# Patient Record
Sex: MALE | Race: OTHER | Employment: UNEMPLOYED | URBAN - METROPOLITAN AREA
[De-identification: names, ages, dates, MRNs, and addresses within clinical notes are randomized per-mention and may not be internally consistent; named-entity substitution may affect disease eponyms.]

---

## 2017-10-26 ENCOUNTER — HOSPITAL ENCOUNTER (INPATIENT)
Age: 42
LOS: 1 days | Discharge: HOME | DRG: 465 | End: 2017-10-27
Attending: EMERGENCY MEDICINE | Admitting: INTERNAL MEDICINE
Payer: MEDICARE

## 2017-10-26 ENCOUNTER — APPOINTMENT (OUTPATIENT)
Dept: GENERAL RADIOLOGY | Age: 42
DRG: 465 | End: 2017-10-26
Payer: MEDICARE

## 2017-10-26 DIAGNOSIS — N17.9 ACUTE KIDNEY INJURY (HCC): ICD-10-CM

## 2017-10-26 DIAGNOSIS — N20.1 URETEROLITHIASIS: ICD-10-CM

## 2017-10-26 DIAGNOSIS — N23 RENAL COLIC: Primary | ICD-10-CM

## 2017-10-26 LAB
ABSOLUTE EOS #: 0.2 K/UL (ref 0–0.4)
ABSOLUTE IMMATURE GRANULOCYTE: ABNORMAL K/UL (ref 0–0.3)
ABSOLUTE LYMPH #: 1.1 K/UL (ref 1–4.8)
ABSOLUTE MONO #: 0.6 K/UL (ref 0.2–0.8)
ANION GAP SERPL CALCULATED.3IONS-SCNC: 12 MMOL/L (ref 9–17)
BASOPHILS # BLD: 0 %
BASOPHILS ABSOLUTE: 0 K/UL (ref 0–0.2)
BILIRUBIN URINE: NEGATIVE
BUN BLDV-MCNC: 13 MG/DL (ref 6–20)
BUN/CREAT BLD: 7 (ref 9–20)
CALCIUM SERPL-MCNC: 8.3 MG/DL (ref 8.6–10.4)
CHLORIDE BLD-SCNC: 102 MMOL/L (ref 98–107)
CO2: 25 MMOL/L (ref 20–31)
COLOR: YELLOW
COMMENT UA: NORMAL
CREAT SERPL-MCNC: 1.81 MG/DL (ref 0.7–1.2)
DIFFERENTIAL TYPE: ABNORMAL
EOSINOPHILS RELATIVE PERCENT: 2 %
GFR AFRICAN AMERICAN: 51 ML/MIN
GFR NON-AFRICAN AMERICAN: 42 ML/MIN
GFR SERPL CREATININE-BSD FRML MDRD: ABNORMAL ML/MIN/{1.73_M2}
GFR SERPL CREATININE-BSD FRML MDRD: ABNORMAL ML/MIN/{1.73_M2}
GLUCOSE BLD-MCNC: 93 MG/DL (ref 70–99)
GLUCOSE URINE: NEGATIVE
HCT VFR BLD CALC: 36.4 % (ref 41–53)
HEMOGLOBIN: 12.2 G/DL (ref 13.5–17.5)
IMMATURE GRANULOCYTES: ABNORMAL %
KETONES, URINE: NEGATIVE
LEUKOCYTE ESTERASE, URINE: NEGATIVE
LYMPHOCYTES # BLD: 14 %
MCH RBC QN AUTO: 27.2 PG (ref 26–34)
MCHC RBC AUTO-ENTMCNC: 33.5 G/DL (ref 31–37)
MCV RBC AUTO: 81.2 FL (ref 80–100)
MONOCYTES # BLD: 8 %
NITRITE, URINE: NEGATIVE
PDW BLD-RTO: 13.4 % (ref 11.5–14.5)
PH UA: 5.5 (ref 5–8)
PLATELET # BLD: 129 K/UL (ref 130–400)
PLATELET ESTIMATE: ABNORMAL
PMV BLD AUTO: 9.5 FL (ref 6–12)
POTASSIUM SERPL-SCNC: 4 MMOL/L (ref 3.7–5.3)
PROTEIN UA: NEGATIVE
RBC # BLD: 4.48 M/UL (ref 4.5–5.9)
RBC # BLD: ABNORMAL 10*6/UL
SEG NEUTROPHILS: 76 %
SEGMENTED NEUTROPHILS ABSOLUTE COUNT: 6.2 K/UL (ref 1.8–7.7)
SODIUM BLD-SCNC: 139 MMOL/L (ref 135–144)
SPECIFIC GRAVITY UA: 1.02 (ref 1–1.03)
TURBIDITY: CLEAR
URINE HGB: NEGATIVE
UROBILINOGEN, URINE: NORMAL
WBC # BLD: 8.1 K/UL (ref 3.5–11)
WBC # BLD: ABNORMAL 10*3/UL

## 2017-10-26 PROCEDURE — 80048 BASIC METABOLIC PNL TOTAL CA: CPT

## 2017-10-26 PROCEDURE — 99285 EMERGENCY DEPT VISIT HI MDM: CPT

## 2017-10-26 PROCEDURE — 1200000000 HC SEMI PRIVATE

## 2017-10-26 PROCEDURE — 74000 XR ABDOMEN LIMITED (KUB): CPT

## 2017-10-26 PROCEDURE — 81003 URINALYSIS AUTO W/O SCOPE: CPT

## 2017-10-26 PROCEDURE — 6360000002 HC RX W HCPCS: Performed by: NURSE PRACTITIONER

## 2017-10-26 PROCEDURE — 2580000003 HC RX 258: Performed by: NURSE PRACTITIONER

## 2017-10-26 PROCEDURE — 85025 COMPLETE CBC W/AUTO DIFF WBC: CPT

## 2017-10-26 RX ORDER — SODIUM CHLORIDE 9 MG/ML
INJECTION, SOLUTION INTRAVENOUS CONTINUOUS
Status: DISCONTINUED | OUTPATIENT
Start: 2017-10-26 | End: 2017-10-27 | Stop reason: DRUGHIGH

## 2017-10-26 RX ORDER — CIPROFLOXACIN 2 MG/ML
400 INJECTION, SOLUTION INTRAVENOUS EVERY 12 HOURS
Status: DISCONTINUED | OUTPATIENT
Start: 2017-10-26 | End: 2017-10-27 | Stop reason: HOSPADM

## 2017-10-26 RX ORDER — 0.9 % SODIUM CHLORIDE 0.9 %
1000 INTRAVENOUS SOLUTION INTRAVENOUS ONCE
Status: COMPLETED | OUTPATIENT
Start: 2017-10-26 | End: 2017-10-26

## 2017-10-26 RX ADMIN — SODIUM CHLORIDE 1000 ML: 9 INJECTION, SOLUTION INTRAVENOUS at 19:32

## 2017-10-26 RX ADMIN — CIPROFLOXACIN 400 MG: 2 INJECTION, SOLUTION INTRAVENOUS at 22:57

## 2017-10-26 RX ADMIN — SODIUM CHLORIDE: 9 INJECTION, SOLUTION INTRAVENOUS at 19:35

## 2017-10-26 ASSESSMENT — PAIN SCALES - GENERAL: PAINLEVEL_OUTOF10: 4

## 2017-10-26 ASSESSMENT — PAIN SCALES - WONG BAKER: WONGBAKER_NUMERICALRESPONSE: 4

## 2017-10-26 ASSESSMENT — PAIN DESCRIPTION - LOCATION: LOCATION: FLANK

## 2017-10-26 ASSESSMENT — PAIN DESCRIPTION - FREQUENCY: FREQUENCY: INTERMITTENT

## 2017-10-26 ASSESSMENT — PAIN DESCRIPTION - DESCRIPTORS: DESCRIPTORS: SHARP

## 2017-10-27 ENCOUNTER — APPOINTMENT (OUTPATIENT)
Dept: CT IMAGING | Age: 42
DRG: 465 | End: 2017-10-27
Payer: MEDICARE

## 2017-10-27 ENCOUNTER — ANESTHESIA (OUTPATIENT)
Dept: OPERATING ROOM | Age: 42
DRG: 465 | End: 2017-10-27
Payer: MEDICARE

## 2017-10-27 ENCOUNTER — ANESTHESIA EVENT (OUTPATIENT)
Dept: OPERATING ROOM | Age: 42
DRG: 465 | End: 2017-10-27
Payer: MEDICARE

## 2017-10-27 ENCOUNTER — APPOINTMENT (OUTPATIENT)
Dept: GENERAL RADIOLOGY | Age: 42
DRG: 465 | End: 2017-10-27
Payer: MEDICARE

## 2017-10-27 VITALS
DIASTOLIC BLOOD PRESSURE: 76 MMHG | BODY MASS INDEX: 29.4 KG/M2 | TEMPERATURE: 98.2 F | SYSTOLIC BLOOD PRESSURE: 130 MMHG | HEART RATE: 78 BPM | RESPIRATION RATE: 20 BRPM | WEIGHT: 210 LBS | OXYGEN SATURATION: 95 % | HEIGHT: 71 IN

## 2017-10-27 VITALS — DIASTOLIC BLOOD PRESSURE: 53 MMHG | OXYGEN SATURATION: 98 % | SYSTOLIC BLOOD PRESSURE: 93 MMHG

## 2017-10-27 PROBLEM — N13.9 OBSTRUCTIVE UROPATHY: Status: ACTIVE | Noted: 2017-10-27

## 2017-10-27 PROBLEM — N17.9 AKI (ACUTE KIDNEY INJURY) (HCC): Status: ACTIVE | Noted: 2017-10-27

## 2017-10-27 PROBLEM — N20.1 URETERAL CALCULUS, RIGHT: Status: ACTIVE | Noted: 2017-10-27

## 2017-10-27 PROBLEM — N23 RENAL COLIC ON RIGHT SIDE: Status: ACTIVE | Noted: 2017-10-27

## 2017-10-27 LAB
-: ABNORMAL
AMORPHOUS: ABNORMAL
ANION GAP SERPL CALCULATED.3IONS-SCNC: 10 MMOL/L (ref 9–17)
BACTERIA: ABNORMAL
BILIRUBIN URINE: NEGATIVE
BUN BLDV-MCNC: 10 MG/DL (ref 6–20)
BUN/CREAT BLD: 9 (ref 9–20)
CALCIUM SERPL-MCNC: 8.4 MG/DL (ref 8.6–10.4)
CASTS UA: ABNORMAL /LPF
CHLORIDE BLD-SCNC: 107 MMOL/L (ref 98–107)
CO2: 26 MMOL/L (ref 20–31)
COLOR: YELLOW
COMMENT UA: ABNORMAL
CREAT SERPL-MCNC: 1.11 MG/DL (ref 0.7–1.2)
CRYSTALS, UA: ABNORMAL /HPF
EPITHELIAL CELLS UA: ABNORMAL /HPF
GFR AFRICAN AMERICAN: >60 ML/MIN
GFR NON-AFRICAN AMERICAN: >60 ML/MIN
GFR SERPL CREATININE-BSD FRML MDRD: ABNORMAL ML/MIN/{1.73_M2}
GFR SERPL CREATININE-BSD FRML MDRD: ABNORMAL ML/MIN/{1.73_M2}
GLUCOSE BLD-MCNC: 91 MG/DL (ref 70–99)
GLUCOSE URINE: NEGATIVE
HCT VFR BLD CALC: 36.3 % (ref 41–53)
HEMOGLOBIN: 12 G/DL (ref 13.5–17.5)
KETONES, URINE: ABNORMAL
LEUKOCYTE ESTERASE, URINE: NEGATIVE
MCH RBC QN AUTO: 27.1 PG (ref 26–34)
MCHC RBC AUTO-ENTMCNC: 33.2 G/DL (ref 31–37)
MCV RBC AUTO: 81.7 FL (ref 80–100)
MUCUS: ABNORMAL
NITRITE, URINE: NEGATIVE
OTHER OBSERVATIONS UA: ABNORMAL
PDW BLD-RTO: 13.1 % (ref 11.5–14.5)
PH UA: 6 (ref 5–8)
PLATELET # BLD: 133 K/UL (ref 130–400)
PMV BLD AUTO: 9.8 FL (ref 6–12)
POTASSIUM SERPL-SCNC: 4.2 MMOL/L (ref 3.7–5.3)
PROTEIN UA: NEGATIVE
RBC # BLD: 4.44 M/UL (ref 4.5–5.9)
RBC UA: ABNORMAL /HPF (ref 0–2)
RENAL EPITHELIAL, UA: ABNORMAL /HPF
SODIUM BLD-SCNC: 143 MMOL/L (ref 135–144)
SPECIFIC GRAVITY UA: 1 (ref 1–1.03)
TRICHOMONAS: ABNORMAL
TURBIDITY: CLEAR
URINE HGB: ABNORMAL
UROBILINOGEN, URINE: NORMAL
WBC # BLD: 6.4 K/UL (ref 3.5–11)
WBC UA: ABNORMAL /HPF (ref 0–5)
YEAST: ABNORMAL

## 2017-10-27 PROCEDURE — 6360000004 HC RX CONTRAST MEDICATION: Performed by: UROLOGY

## 2017-10-27 PROCEDURE — 99223 1ST HOSP IP/OBS HIGH 75: CPT | Performed by: INTERNAL MEDICINE

## 2017-10-27 PROCEDURE — 3700000000 HC ANESTHESIA ATTENDED CARE: Performed by: UROLOGY

## 2017-10-27 PROCEDURE — 6360000002 HC RX W HCPCS: Performed by: UROLOGY

## 2017-10-27 PROCEDURE — S0028 INJECTION, FAMOTIDINE, 20 MG: HCPCS | Performed by: NURSE PRACTITIONER

## 2017-10-27 PROCEDURE — 7100000000 HC PACU RECOVERY - FIRST 15 MIN: Performed by: UROLOGY

## 2017-10-27 PROCEDURE — 6360000002 HC RX W HCPCS: Performed by: NURSE ANESTHETIST, CERTIFIED REGISTERED

## 2017-10-27 PROCEDURE — BT141ZZ FLUOROSCOPY OF KIDNEYS, URETERS AND BLADDER USING LOW OSMOLAR CONTRAST: ICD-10-PCS | Performed by: UROLOGY

## 2017-10-27 PROCEDURE — 81001 URINALYSIS AUTO W/SCOPE: CPT

## 2017-10-27 PROCEDURE — 0T768DZ DILATION OF RIGHT URETER WITH INTRALUMINAL DEVICE, VIA NATURAL OR ARTIFICIAL OPENING ENDOSCOPIC: ICD-10-PCS | Performed by: UROLOGY

## 2017-10-27 PROCEDURE — 3209999900 FLUORO FOR SURGICAL PROCEDURES

## 2017-10-27 PROCEDURE — 74176 CT ABD & PELVIS W/O CONTRAST: CPT

## 2017-10-27 PROCEDURE — 6360000002 HC RX W HCPCS: Performed by: NURSE PRACTITIONER

## 2017-10-27 PROCEDURE — 3600000012 HC SURGERY LEVEL 2 ADDTL 15MIN: Performed by: UROLOGY

## 2017-10-27 PROCEDURE — 2500000003 HC RX 250 WO HCPCS: Performed by: NURSE ANESTHETIST, CERTIFIED REGISTERED

## 2017-10-27 PROCEDURE — 74420 UROGRAPHY RTRGR +-KUB: CPT

## 2017-10-27 PROCEDURE — 2580000003 HC RX 258: Performed by: NURSE ANESTHETIST, CERTIFIED REGISTERED

## 2017-10-27 PROCEDURE — 0TF68ZZ FRAGMENTATION IN RIGHT URETER, VIA NATURAL OR ARTIFICIAL OPENING ENDOSCOPIC: ICD-10-PCS | Performed by: UROLOGY

## 2017-10-27 PROCEDURE — 2580000003 HC RX 258: Performed by: NURSE PRACTITIONER

## 2017-10-27 PROCEDURE — 7100000001 HC PACU RECOVERY - ADDTL 15 MIN: Performed by: UROLOGY

## 2017-10-27 PROCEDURE — 3700000001 HC ADD 15 MINUTES (ANESTHESIA): Performed by: UROLOGY

## 2017-10-27 PROCEDURE — 85027 COMPLETE CBC AUTOMATED: CPT

## 2017-10-27 PROCEDURE — 6370000000 HC RX 637 (ALT 250 FOR IP): Performed by: UROLOGY

## 2017-10-27 PROCEDURE — 80048 BASIC METABOLIC PNL TOTAL CA: CPT

## 2017-10-27 PROCEDURE — 2500000003 HC RX 250 WO HCPCS: Performed by: NURSE PRACTITIONER

## 2017-10-27 PROCEDURE — 3600000002 HC SURGERY LEVEL 2 BASE: Performed by: UROLOGY

## 2017-10-27 PROCEDURE — C1769 GUIDE WIRE: HCPCS | Performed by: UROLOGY

## 2017-10-27 PROCEDURE — 0T7D8ZZ DILATION OF URETHRA, VIA NATURAL OR ARTIFICIAL OPENING ENDOSCOPIC: ICD-10-PCS | Performed by: UROLOGY

## 2017-10-27 PROCEDURE — C2617 STENT, NON-COR, TEM W/O DEL: HCPCS | Performed by: UROLOGY

## 2017-10-27 DEVICE — URETERAL STENT
Type: IMPLANTABLE DEVICE | Site: URETER | Status: FUNCTIONAL
Brand: POLARIS™ ULTRA

## 2017-10-27 RX ORDER — MORPHINE SULFATE 2 MG/ML
1 INJECTION, SOLUTION INTRAMUSCULAR; INTRAVENOUS
Status: DISCONTINUED | OUTPATIENT
Start: 2017-10-27 | End: 2017-10-27 | Stop reason: HOSPADM

## 2017-10-27 RX ORDER — SODIUM CHLORIDE, SODIUM LACTATE, POTASSIUM CHLORIDE, CALCIUM CHLORIDE 600; 310; 30; 20 MG/100ML; MG/100ML; MG/100ML; MG/100ML
INJECTION, SOLUTION INTRAVENOUS CONTINUOUS PRN
Status: DISCONTINUED | OUTPATIENT
Start: 2017-10-27 | End: 2017-10-27 | Stop reason: SDUPTHER

## 2017-10-27 RX ORDER — ONDANSETRON 2 MG/ML
4 INJECTION INTRAMUSCULAR; INTRAVENOUS
Status: DISCONTINUED | OUTPATIENT
Start: 2017-10-27 | End: 2017-10-27 | Stop reason: HOSPADM

## 2017-10-27 RX ORDER — PROPOFOL 10 MG/ML
INJECTION, EMULSION INTRAVENOUS PRN
Status: DISCONTINUED | OUTPATIENT
Start: 2017-10-27 | End: 2017-10-27 | Stop reason: SDUPTHER

## 2017-10-27 RX ORDER — SODIUM CHLORIDE 0.9 % (FLUSH) 0.9 %
10 SYRINGE (ML) INJECTION PRN
Status: DISCONTINUED | OUTPATIENT
Start: 2017-10-27 | End: 2017-10-27 | Stop reason: HOSPADM

## 2017-10-27 RX ORDER — FENTANYL CITRATE 50 UG/ML
INJECTION, SOLUTION INTRAMUSCULAR; INTRAVENOUS PRN
Status: DISCONTINUED | OUTPATIENT
Start: 2017-10-27 | End: 2017-10-27 | Stop reason: SDUPTHER

## 2017-10-27 RX ORDER — KETOROLAC TROMETHAMINE 30 MG/ML
30 INJECTION, SOLUTION INTRAMUSCULAR; INTRAVENOUS EVERY 6 HOURS
Status: DISCONTINUED | OUTPATIENT
Start: 2017-10-27 | End: 2017-10-27

## 2017-10-27 RX ORDER — BISACODYL 10 MG
10 SUPPOSITORY, RECTAL RECTAL DAILY PRN
Status: DISCONTINUED | OUTPATIENT
Start: 2017-10-27 | End: 2017-10-27 | Stop reason: HOSPADM

## 2017-10-27 RX ORDER — HYDROCODONE BITARTRATE AND ACETAMINOPHEN 5; 325 MG/1; MG/1
1 TABLET ORAL EVERY 6 HOURS PRN
Qty: 20 TABLET | Refills: 0 | Status: SHIPPED | OUTPATIENT
Start: 2017-10-27 | End: 2017-11-01

## 2017-10-27 RX ORDER — PHENAZOPYRIDINE HYDROCHLORIDE 100 MG/1
100 TABLET, FILM COATED ORAL 3 TIMES DAILY PRN
Qty: 21 TABLET | Refills: 1 | Status: SHIPPED | OUTPATIENT
Start: 2017-10-27 | End: 2017-11-03

## 2017-10-27 RX ORDER — DEXAMETHASONE SODIUM PHOSPHATE 10 MG/ML
INJECTION INTRAMUSCULAR; INTRAVENOUS PRN
Status: DISCONTINUED | OUTPATIENT
Start: 2017-10-27 | End: 2017-10-27 | Stop reason: SDUPTHER

## 2017-10-27 RX ORDER — SODIUM CHLORIDE 0.9 % (FLUSH) 0.9 %
10 SYRINGE (ML) INJECTION EVERY 12 HOURS SCHEDULED
Status: DISCONTINUED | OUTPATIENT
Start: 2017-10-27 | End: 2017-10-27 | Stop reason: HOSPADM

## 2017-10-27 RX ORDER — TAMSULOSIN HYDROCHLORIDE 0.4 MG/1
0.4 CAPSULE ORAL DAILY
Qty: 30 CAPSULE | Refills: 1 | Status: SHIPPED | OUTPATIENT
Start: 2017-10-27

## 2017-10-27 RX ORDER — FENTANYL CITRATE 50 UG/ML
50 INJECTION, SOLUTION INTRAMUSCULAR; INTRAVENOUS EVERY 5 MIN PRN
Status: DISCONTINUED | OUTPATIENT
Start: 2017-10-27 | End: 2017-10-27 | Stop reason: HOSPADM

## 2017-10-27 RX ORDER — SODIUM CHLORIDE 9 MG/ML
INJECTION, SOLUTION INTRAVENOUS CONTINUOUS
Status: DISCONTINUED | OUTPATIENT
Start: 2017-10-27 | End: 2017-10-27 | Stop reason: HOSPADM

## 2017-10-27 RX ORDER — ONDANSETRON 2 MG/ML
INJECTION INTRAMUSCULAR; INTRAVENOUS PRN
Status: DISCONTINUED | OUTPATIENT
Start: 2017-10-27 | End: 2017-10-27 | Stop reason: SDUPTHER

## 2017-10-27 RX ORDER — NICOTINE 21 MG/24HR
1 PATCH, TRANSDERMAL 24 HOURS TRANSDERMAL DAILY PRN
Status: DISCONTINUED | OUTPATIENT
Start: 2017-10-27 | End: 2017-10-27 | Stop reason: CLARIF

## 2017-10-27 RX ORDER — FENTANYL CITRATE 50 UG/ML
25 INJECTION, SOLUTION INTRAMUSCULAR; INTRAVENOUS EVERY 5 MIN PRN
Status: DISCONTINUED | OUTPATIENT
Start: 2017-10-27 | End: 2017-10-27 | Stop reason: HOSPADM

## 2017-10-27 RX ORDER — PANTOPRAZOLE SODIUM 40 MG/1
40 TABLET, DELAYED RELEASE ORAL
Status: DISCONTINUED | OUTPATIENT
Start: 2017-10-28 | End: 2017-10-27 | Stop reason: HOSPADM

## 2017-10-27 RX ORDER — ONDANSETRON 2 MG/ML
4 INJECTION INTRAMUSCULAR; INTRAVENOUS EVERY 6 HOURS PRN
Status: DISCONTINUED | OUTPATIENT
Start: 2017-10-27 | End: 2017-10-27 | Stop reason: HOSPADM

## 2017-10-27 RX ORDER — CIPROFLOXACIN 500 MG/1
500 TABLET, FILM COATED ORAL 2 TIMES DAILY
Qty: 12 TABLET | Refills: 0 | Status: SHIPPED | OUTPATIENT
Start: 2017-10-27 | End: 2017-11-02

## 2017-10-27 RX ORDER — MIDAZOLAM HYDROCHLORIDE 1 MG/ML
INJECTION INTRAMUSCULAR; INTRAVENOUS PRN
Status: DISCONTINUED | OUTPATIENT
Start: 2017-10-27 | End: 2017-10-27 | Stop reason: SDUPTHER

## 2017-10-27 RX ORDER — LIDOCAINE HYDROCHLORIDE 20 MG/ML
INJECTION, SOLUTION EPIDURAL; INFILTRATION; INTRACAUDAL; PERINEURAL PRN
Status: DISCONTINUED | OUTPATIENT
Start: 2017-10-27 | End: 2017-10-27 | Stop reason: SDUPTHER

## 2017-10-27 RX ADMIN — SODIUM CHLORIDE: 9 INJECTION, SOLUTION INTRAVENOUS at 02:41

## 2017-10-27 RX ADMIN — PROPOFOL 200 MG: 10 INJECTION, EMULSION INTRAVENOUS at 13:05

## 2017-10-27 RX ADMIN — DEXAMETHASONE SODIUM PHOSPHATE 10 MG: 10 INJECTION INTRAMUSCULAR; INTRAVENOUS at 13:32

## 2017-10-27 RX ADMIN — FAMOTIDINE 20 MG: 10 INJECTION, SOLUTION INTRAVENOUS at 02:42

## 2017-10-27 RX ADMIN — Medication 10 ML: at 10:25

## 2017-10-27 RX ADMIN — CIPROFLOXACIN 400 MG: 2 INJECTION, SOLUTION INTRAVENOUS at 10:30

## 2017-10-27 RX ADMIN — ONDANSETRON 4 MG: 2 INJECTION, SOLUTION INTRAMUSCULAR; INTRAVENOUS at 13:45

## 2017-10-27 RX ADMIN — LIDOCAINE HYDROCHLORIDE 100 MG: 20 INJECTION, SOLUTION EPIDURAL; INFILTRATION; INTRACAUDAL; PERINEURAL at 13:05

## 2017-10-27 RX ADMIN — PROPOFOL 100 MG: 10 INJECTION, EMULSION INTRAVENOUS at 13:13

## 2017-10-27 RX ADMIN — SODIUM CHLORIDE, POTASSIUM CHLORIDE, SODIUM LACTATE AND CALCIUM CHLORIDE: 600; 310; 30; 20 INJECTION, SOLUTION INTRAVENOUS at 12:58

## 2017-10-27 RX ADMIN — FAMOTIDINE 20 MG: 10 INJECTION, SOLUTION INTRAVENOUS at 10:25

## 2017-10-27 RX ADMIN — ONDANSETRON 4 MG: 2 INJECTION INTRAMUSCULAR; INTRAVENOUS at 02:41

## 2017-10-27 RX ADMIN — ONDANSETRON 4 MG: 2 INJECTION INTRAMUSCULAR; INTRAVENOUS at 09:29

## 2017-10-27 RX ADMIN — MIDAZOLAM HYDROCHLORIDE 2 MG: 1 INJECTION, SOLUTION INTRAMUSCULAR; INTRAVENOUS at 12:58

## 2017-10-27 RX ADMIN — FENTANYL CITRATE 100 MCG: 50 INJECTION, SOLUTION INTRAMUSCULAR; INTRAVENOUS at 13:05

## 2017-10-27 RX ADMIN — KETOROLAC TROMETHAMINE 30 MG: 30 INJECTION, SOLUTION INTRAMUSCULAR at 02:41

## 2017-10-27 ASSESSMENT — ENCOUNTER SYMPTOMS
NAUSEA: 0
VOMITING: 0
RHINORRHEA: 0
SHORTNESS OF BREATH: 0
COUGH: 0
WHEEZING: 0
COLOR CHANGE: 0
ABDOMINAL PAIN: 0
CONSTIPATION: 0
SORE THROAT: 0
DIARRHEA: 0
SINUS PRESSURE: 0

## 2017-10-27 ASSESSMENT — PAIN SCALES - GENERAL
PAINLEVEL_OUTOF10: 2
PAINLEVEL_OUTOF10: 0
PAINLEVEL_OUTOF10: 3
PAINLEVEL_OUTOF10: 0

## 2017-10-27 ASSESSMENT — PAIN DESCRIPTION - PAIN TYPE: TYPE: ACUTE PAIN

## 2017-10-27 ASSESSMENT — PAIN DESCRIPTION - ORIENTATION: ORIENTATION: RIGHT

## 2017-10-27 ASSESSMENT — PAIN DESCRIPTION - LOCATION: LOCATION: FLANK

## 2017-10-27 NOTE — PROGRESS NOTES
Discharge paperwork and instructions given to the patient; patient's wife present. Discharged home, no distress. Unable to obtain urine sample.

## 2017-10-27 NOTE — PLAN OF CARE
Problem: Pain:  Goal: Pain level will decrease  Pain level will decrease  Outcome: Ongoing  Pain level assessment complete. Pt educated on pain scale and control interventions. PRN pain medication given per pt request.   Pt instructed to call out with new onset of pain or unrelieved pain. Problem: Falls - Risk of:  Goal: Will remain free from falls  Will remain free from falls  Outcome: Ongoing  Siderails up x 2  Hourly rounding. Call light in reach. Instructed to call for assist before attempting out of bed. Remains free from falls and accidental injury at this time. Floor free from obstacles, and bed is locked and in lowest position. Adequate lighting provided.

## 2017-10-27 NOTE — CONSULTS
[facility]  Urology Consultation    Patient:  Rosi Ferrera  MRN: [id number]  YOB: 1975    CHIEF COMPLAINT:  Right ureteral colic acute kidney injury    HISTORY OF PRESENT ILLNESS:   The patient is a 38 y.o. male who presents with with severe right ureteral colic and flank pain, the pain is intractable, the patient was admitted for further management, he was started on antibiotic hydration and pain management follow-up imaging demonstrated persistent obstruction associated with the stone    Patient's old records, notes and chart reviewed and summarized above. Past Medical History:    Past Medical History:   Diagnosis Date    GERD (gastroesophageal reflux disease)     Herniated disc     Kidney stone        Past Surgical History:    Past Surgical History:   Procedure Laterality Date    LITHOTRIPSY Right 10/27/2017    stent placement    UPPER GASTROINTESTINAL ENDOSCOPY      UPPER GASTROINTESTINAL ENDOSCOPY  3 2 15    cold biopsies    WISDOM TOOTH EXTRACTION       Previous  surgery: Stone disease     Medications:    Scheduled Meds:   sodium chloride flush  10 mL Intravenous 2 times per day    enoxaparin  40 mg Subcutaneous Daily    famotidine (PEPCID) injection  20 mg Intravenous BID    [START ON 10/28/2017] pantoprazole  40 mg Oral QAM AC    ciprofloxacin  400 mg Intravenous Q12H     Continuous Infusions:   sodium chloride 150 mL/hr at 10/27/17 0241     PRN Meds:.sodium chloride flush, bisacodyl, ondansetron, morphine    Allergies:  Review of patient's allergies indicates no known allergies. Social History:    Social History     Social History    Marital status:      Spouse name: N/A    Number of children: N/A    Years of education: N/A     Occupational History    Not on file.      Social History Main Topics    Smoking status: Never Smoker    Smokeless tobacco: Never Used    Alcohol use No    Drug use: No    Sexual activity: Not on file     Other Topics Concern

## 2017-10-27 NOTE — ANESTHESIA POSTPROCEDURE EVALUATION
Department of Anesthesiology  Postprocedure Note    Patient: Shlomo Claude Ramadan  MRN: [id number]  YOB: 1975  Date of evaluation: 10/27/2017  Time:  3:03 PM     Procedure Summary     Date:  10/27/17 Room / Location:  [facility] / [facility] OR    Anesthesia Start:  5295 Anesthesia Stop:  5318    Procedure:  CYSTOSCOPY RIGHT URETEROSCOPY  WITH HOLMIUM LASER,BILATERAL  PYELOGRAM, URETRAL DILATION, RIGHT STENT INSERTION, BALLOON DILATION (N/A ) Diagnosis:  (hydroneph)    Surgeon:  Jeffry Lancaster MD Responsible Provider:  Jerry Durant MD    Anesthesia Type:  MAC, general ASA Status:  2          Anesthesia Type: General     Last vitals: Reviewed and per EMR flowsheets.        Anesthesia Post Evaluation    Patient location during evaluation: PACU  Patient participation: complete - patient participated  Level of consciousness: awake and alert  Airway patency: patent  Nausea & Vomiting: no nausea and no vomiting  Complications: no  Cardiovascular status: blood pressure returned to baseline  Respiratory status: acceptable  Hydration status: euvolemic

## 2017-10-27 NOTE — PLAN OF CARE
Problem: Safety:  Goal: Free from accidental physical injury  Free from accidental physical injury  Outcome: Ongoing      Problem: Daily Care:  Goal: Daily care needs are met  Daily care needs are met  Outcome: Ongoing      Problem: Discharge Planning:  Goal: Patients continuum of care needs are met  Patients continuum of care needs are met  Outcome: Ongoing

## 2017-10-27 NOTE — PROGRESS NOTES
Patient admitted to room from ER. Patient is alert and orientated. Patient denies pain at this time. Patient denies nausea and vomiting. Bed locked in lowest position, orders reviewed. Call light within reach. Urinal and strainer in the bathroom to strain urine for stones. Will continue to monitor.

## 2017-10-27 NOTE — DISCHARGE SUMMARY
10/27/2017    RBC 4.44 10/27/2017    HGB 12.0 10/27/2017    HCT 36.3 10/27/2017    MCV 81.7 10/27/2017    MCH 27.1 10/27/2017    MCHC 33.2 10/27/2017    RDW 13.1 10/27/2017     10/27/2017     BMP:    Lab Results   Component Value Date    GLUCOSE 91 10/27/2017     10/27/2017    K 4.2 10/27/2017     10/27/2017    CO2 26 10/27/2017    ANIONGAP 10 10/27/2017    BUN 10 10/27/2017    CREATININE 1.11 10/27/2017    BUNCRER 9 10/27/2017    CALCIUM 8.4 10/27/2017    LABGLOM >60 10/27/2017    GFRAA >60 10/27/2017    GFR      10/27/2017    GFR NOT REPORTED 10/27/2017     HFP:    Lab Results   Component Value Date    PROT 7.2 01/27/2015     CMP:    Lab Results   Component Value Date    GLUCOSE 91 10/27/2017     10/27/2017    K 4.2 10/27/2017     10/27/2017    CO2 26 10/27/2017    BUN 10 10/27/2017    CREATININE 1.11 10/27/2017    ANIONGAP 10 10/27/2017    ALKPHOS 87 01/27/2015    ALT 41 01/27/2015    AST 33 01/27/2015    BILITOT 0.65 01/27/2015    LABALBU 4.4 01/27/2015    ALBUMIN 1.6 01/27/2015    LABGLOM >60 10/27/2017    GFRAA >60 10/27/2017    GFR      10/27/2017    GFR NOT REPORTED 10/27/2017    PROT 7.2 01/27/2015    CALCIUM 8.4 10/27/2017     PT/INR:  No results found for: PTINR  PTT:   Lab Results   Component Value Date    APTT 25.7 08/15/2015     FLP:    Lab Results   Component Value Date    CHOL 159 01/27/2015    TRIG 157 01/27/2015    HDL 40 01/27/2015     U/A:    Lab Results   Component Value Date    COLORU YELLOW 10/27/2017    TURBIDITY CLEAR 10/27/2017    SPECGRAV 1.003 10/27/2017    HGBUR 3+ 10/27/2017    PHUR 6.0 10/27/2017    PROTEINU NEGATIVE 10/27/2017    GLUCOSEU NEGATIVE 10/27/2017    KETUA 1+ 10/27/2017    BILIRUBINUR NEGATIVE 10/27/2017    UROBILINOGEN Normal 10/27/2017    NITRU NEGATIVE 10/27/2017    LEUKOCYTESUR NEGATIVE 10/27/2017     TSH:    Lab Results   Component Value Date    TSH 1.35 01/27/2015         Radiology:    Ct Abdomen Pelvis Wo Iv Contrast Additional Contrast? None    Result Date: 10/27/2017  EXAMINATION: CT OF THE ABDOMEN AND PELVIS WITHOUT CONTRAST 10/27/2017 9:03 am TECHNIQUE: CT of the abdomen and pelvis was performed without the administration of intravenous contrast. Multiplanar reformatted images are provided for review. Dose modulation, iterative reconstruction, and/or weight based adjustment of the mA/kV was utilized to reduce the radiation dose to as low as reasonably achievable. COMPARISON: None. HISTORY: ORDERING SYSTEM PROVIDED HISTORY: identify stone TECHNOLOGIST PROVIDED HISTORY: Additional Contrast?->None 72-year-old male with history of multiple stones who is going to cystography for right kidney stone this morning ; no history of surgery; please identify stone FINDINGS: Lower Chest: Mild bibasilar atelectasis. No free intra-abdominal air identified. Organs: Poorly defined low attenuation adjacent to the gallbladder fossa along the inferior right hepatic lobe on axial images 70 through 80, series 2. Punctate 2 mm nonobstructing calculus at the upper pole of the left kidney on image 61, series 2. Liver, spleen, pancreas, adrenal glands, and left kidney otherwise grossly unremarkable in appearance on limited noncontrast imaging. Moderate to severe right-sided hydronephrosis with associated perinephric stranding and associated moderate hydroureter secondary to an obstructing calculus at the right ureteral vesicular junction measuring up to 4 mm on image 181, series 2. GI/Bowel: Small hiatal hernia. No significant dilation of small bowel loops to suggest small bowel obstruction. Normal gas-filled appendix. Mild diverticulosis of the rectosigmoid and sigmoid colon. Mild scattered colonic diverticulosis. Mild stool burden. Pelvis: Phleboliths in the pelvis. Urinary bladder is slightly distended. Small fat containing right inguinal hernia. No inguinal or pelvic sidewall lymphadenopathy identified.  Peritoneum/Retroperitoneum: Abdominal aorta normal in appearance and caliber. No retroperitoneal lymphadenopathy. Psoas muscles normal in size and symmetric in appearance. Atherosclerotic calcification of the bilateral iliac branch vasculature. Prominent borderline enlarged lymph nodes within the small bowel mesentery. Bones/Soft Tissues: No acute osseous abnormality identified. Small midline fat containing periumbilical hernia. Straightening of the lumbar spine. 1. Obstructing 4 mm calculus at the right UVJ with moderate to severe right-sided hydronephrosis and associated hydroureter. 2. Punctate nonobstructing calculus measuring 2 mm at the upper pole of the left kidney. 3. Mild scattered colonic diverticulosis. Mild diverticulosis of the rectosigmoid and sigmoid colon. 4. Normal appendix. 5. Small hiatal hernia. 6. Poorly defined region of low attenuation in the inferior right hepatic lobe adjacent to the gallbladder fossa. Initial further evaluation with nonemergent right upper quadrant abdominal/liver ultrasound is recommended. MR liver mass protocol may eventually be necessary for optimal evaluation. Xr Abdomen (kub) (single Ap View)    Result Date: 10/26/2017  EXAMINATION: SUPINE VIEW(S) OF THE ABDOMEN 10/26/2017 5:27 pm COMPARISON: None. HISTORY: ORDERING SYSTEM PROVIDED HISTORY: Pain TECHNOLOGIST PROVIDED HISTORY: Reason for exam:->Pain Ordering Physician Provided Reason for Exam: right side kidney stone Acuity: Acute Type of Exam: Initial Additional signs and symptoms: abdomen pain middle right side started 2 days ago FINDINGS: Increased stool. Nonspecific bowel gas pattern without evidence of obstruction. No abnormal calcifications. No acute osseous abnormality. Increased stool     Fl Retrograde Pyelogram W Wo Kub    Result Date: 10/27/2017  EXAMINATION: SPOT FLUOROSCOPIC IMAGES 10/27/2017 1:50 pm TECHNIQUE: Fluoroscopy was provided by the radiology department for procedure. Radiologist was not present during examination.  FLUOROSCOPY known as:  PRILOSEC        STOP taking these medications    ibuprofen 800 MG tablet  Commonly known as:  ADVIL;MOTRIN            Time Spent on discharge is  20 mins in patient examination, evaluation, counseling as well as medication reconciliation, prescriptions for required medications, discharge plan and follow up. Electronically signed by   Yusuf Denise MD  10/27/2017  5:29 PM      Thank you Dr. Rosalie Alexander MD for the opportunity to be involved in this patient's care.

## 2017-10-27 NOTE — ED PROVIDER NOTES
Procedure Laterality Date    UPPER GASTROINTESTINAL ENDOSCOPY      UPPER GASTROINTESTINAL ENDOSCOPY  3 2 15    cold biopsies    WISDOM TOOTH EXTRACTION           FAMILY HISTORY           Problem Relation Age of Onset    Diabetes Mother     Hypertension Mother     High Blood Pressure Father     Diabetes Father     Hypertension Father     Heart Attack Maternal Grandfather      Family Status   Relation Status    Mother Alive    Father Alive    Maternal Grandfather         SOCIAL HISTORY      reports that he has never smoked. He has never used smokeless tobacco. He reports that he does not drink alcohol or use drugs. REVIEW OF SYSTEMS    (2-9 systems for level 4, 10 or more for level 5)     Review of Systems   Constitutional: Negative for chills, fever and unexpected weight change. HENT: Negative for congestion, rhinorrhea, sinus pressure and sore throat. Respiratory: Negative for cough, shortness of breath and wheezing. Cardiovascular: Negative for chest pain and palpitations. Gastrointestinal: Negative for abdominal pain, constipation, diarrhea, nausea and vomiting. Genitourinary: Positive for flank pain. Negative for dysuria and hematuria. Musculoskeletal: Negative for arthralgias and myalgias. Skin: Negative for color change and rash. Neurological: Negative for dizziness, weakness and headaches. Hematological: Negative for adenopathy. Except as noted above the remainder of the review of systems was reviewed and negative. PHYSICAL EXAM    (up to 7 for level 4, 8 or more for level 5)     ED Triage Vitals [10/26/17 1652]   BP Temp Temp Source Pulse Resp SpO2 Height Weight   (!) 147/84 98.9 °F (37.2 °C) Oral 84 16 100 % 5' 11\" (1.803 m) 211 lb (95.7 kg)       Physical Exam   Constitutional: He is oriented to person, place, and time. He appears well-developed and well-nourished. HENT:   Head: Normocephalic and atraumatic.    Mouth/Throat: Oropharynx is clear and (*)     Calcium 8.3 (*)     GFR Non- 42 (*)     GFR  51 (*)     All other components within normal limits   UA W/REFLEX CULTURE       All other labs were within normal range or not returned as of this dictation. EMERGENCY DEPARTMENT COURSE and DIFFERENTIAL DIAGNOSIS/MDM:   Vitals:    Vitals:    10/26/17 1652   BP: (!) 147/84   Pulse: 84   Resp: 16   Temp: 98.9 °F (37.2 °C)   TempSrc: Oral   SpO2: 100%   Weight: 211 lb (95.7 kg)   Height: 5' 11\" (1.803 m)       Medical Decision Making: I discussed this case with Dr Vika Silva and he suggests admission to the hospital and nPO after midnight. I spoke with the NP for [facility] who will admit the patient primary with a urology consult. Patient and family were updated on plan of care and agree with admission  Medications   0.9 % sodium chloride infusion ( Intravenous New Bag 10/26/17 1935)   ciprofloxacin (CIPRO) IVPB 400 mg (0 mg Intravenous Stopped 10/26/17 2357)   0.9 % sodium chloride bolus (0 mLs Intravenous Stopped 10/26/17 2032)       CONSULTS:  IP CONSULT TO UROLOGY  IP CONSULT TO INTERNAL MEDICINE  IP CONSULT TO UROLOGY        FINAL IMPRESSION      1. Renal colic    2. Ureterolithiasis    3.  Acute kidney injury [facility])          DISPOSITION/PLAN   DISPOSITION Admitted    PATIENT REFERRED TO:   MD Rachelle Hancock 1268 [phone]            DISCHARGE MEDICATIONS:     New Prescriptions    No medications on file           (Please note that portions of this note were completed with a voice recognition program.  Efforts were made to edit the dictations but occasionally words are mis-transcribed.)    Jennifer Xavier NP, CNP  Certified Nurse Practitioner            Melvin Davies CNP  10/27/17 2602

## 2017-10-27 NOTE — OP NOTE
[address] [facility]    Operative Note    [facility]  YOB: 1975  [id number]      Pre-operative Diagnosis: Right ureteral colic with obstruction    Post-operative Diagnosis: Same    Procedure: Urethral dilation cystoscopy bilateral retrograde pyelogram right balloon dilatation of the ureter right ureteroscopy, laser lithotripsy stand by , right stent placement    Anesthesia: General    Surgeons/Assistants: Dr Darren Hannah    Estimated Blood Loss: None    Complications: None    Specimens: Was Obtained: Urine    Indications: This is a 81-year-old male, admitted with renal colic, obstructive uropathy, hydronephrosis, acute kidney injury, creatinine up to 1.8, the patient was scheduled for cystoscopy pyelogram stent placement with laser lithotripsy planned on standby    Operative Findings: He was brought to the operating room, positioned in dorsal lithotomy, proper patient identification adequate prepping and draping, we proceeded with urethral dilatation to size 24 [place], 22 cystoscope was introduced bladder, dilation of the bladder revealed no tumors ulcers or stones, some prostate enlargement noticed, we then proceeded with retrograde pyelography, this was followed by inserting a Glidewire ureter and balloon dilatation of the ureter was carried out up to 12 alicia. The balloon dilator was removed right semirigid ureteroscopy was carried out up to the upper ureter without complications, no residual stone fragments identified, the ureter was irrigated the laser was on standby, the ureteroscope was removed, double-J stent was inserted over the Glidewire positioned in the renal pelvis with the distal end in the bladder, bladder was emptied the stent is on a string the string was taped to the penile shaft. .    Patient was returned to recovery room in stable condition    Plan discharge home in a.m.   follow-up visit at the office for stent removal.        Electronically signed by Steven Woods

## 2017-10-27 NOTE — H&P
[facility]    HISTORY AND PHYSICAL EXAMINATION            Date:   10/27/2017  Patient name:  Chon Ferrera  Date of admission:  10/26/2017  4:52 PM  MRN:   [id number]  Account:  [de-identified]  YOB: 1975  PCP:    Crystal Alegre MD  Room:   [facility] OR Pool/NONE  Code Status:    Full Code    Chief Complaint:     Chief Complaint   Patient presents with    Flank Pain     right seen at [facility], onset two days ago       History Obtained From:     patient, electronic medical record    History of Present Illness: Chon Ferrera is a 38 y.o. male who presents to the emergency department By private vehicle for evaluation of right abdominal pain. Patient states that 2 days ago he was seen at [facility] and was diagnosed with a 5 mm stone at the right UVJ. He had signed AMA from there. The pain has got worse since yesterday which is more in the right flank following which he came to ER and was admitted for further evaluation treatment, urology is already seen him and he will be going to OR today  Denies any nausea vomiting  He has passed a stone by itself in the past  Past Medical History:     Past Medical History:   Diagnosis Date    GERD (gastroesophageal reflux disease)     Herniated disc     Kidney stone         Past Surgical History:     Past Surgical History:   Procedure Laterality Date    UPPER GASTROINTESTINAL ENDOSCOPY      UPPER GASTROINTESTINAL ENDOSCOPY  3 2 15    cold biopsies    WISDOM TOOTH EXTRACTION          Medications Prior to Admission:     Prior to Admission medications    Medication Sig Start Date End Date Taking? Authorizing Provider   omeprazole (PRILOSEC) 20 MG capsule 2 times daily  1/20/15  Yes Historical Provider, MD   ibuprofen (ADVIL;MOTRIN) 800 MG tablet  1/20/15  Yes Historical Provider, MD        Allergies:     Review of patient's allergies indicates no known allergies.     Social History: Tobacco:    reports that he has never smoked. He has never used smokeless tobacco.  Alcohol:      reports that he does not drink alcohol. Drug Use:  reports that he does not use drugs. Family History:     Family History   Problem Relation Age of Onset    Diabetes Mother     Hypertension Mother     High Blood Pressure Father     Diabetes Father     Hypertension Father     Heart Attack Maternal Grandfather        Review of Systems:     Positive and Negative as described in HPI. CONSTITUTIONAL:  negative for fevers, chills, sweats, fatigue, weight loss  HEENT:  negative for vision, hearing changes, runny nose, throat pain  RESPIRATORY:  negative for shortness of breath, cough, congestion, wheezing. CARDIOVASCULAR:  negative for chest pain, palpitations. GASTROINTESTINAL:  negative for nausea, vomiting, diarrhea, constipation, change in bowel habits, abdominal pain ,+ right flank and right lower abdominal discomfort  GENITOURINARY:  negative for difficulty of urination, burning with urination, frequency   INTEGUMENT:  negative for rash, skin lesions, easy bruising   HEMATOLOGIC/LYMPHATIC:  negative for swelling/edema   ALLERGIC/IMMUNOLOGIC:  negative for urticaria , itching  ENDOCRINE:  negative increase in drinking, increase in urination, hot or cold intolerance  MUSCULOSKELETAL:  negative joint pains, muscle aches, swelling of joints  NEUROLOGICAL:  negative for headaches, dizziness, lightheadedness, numbness, pain, tingling extremities  BEHAVIOR/PSYCH:  negative for depression, anxiety    Physical Exam:   /78   Pulse 62   Temp 98.2 °F (36.8 °C) (Oral)   Resp 16   Ht 5' 11\" (1.803 m)   Wt 210 lb (95.3 kg)   SpO2 96%   BMI 29.29 kg/m²   Temp (24hrs), Av.8 °F (37.1 °C), Min:98.2 °F (36.8 °C), Max:99.3 °F (37.4 °C)    No results for input(s): POCGLU in the last 72 hours.     Intake/Output Summary (Last 24 hours) at 10/27/17 1357  Last data filed at 10/27/17 1024   Gross per 24 hour Intake             1232 ml   Output              800 ml   Net              432 ml       General Appearance:  alert, well appearing, and in Moderate distress  Mental status: oriented to person, place, and time with normal affect  Head:  normocephalic, atraumatic. Eye: no icterus, redness, pupils equal and reactive, extraocular eye movements intact, conjunctiva clear  Ear: normal external ear, no discharge, hearing intact  Nose:  no drainage noted  Mouth: mucous membranes moist  Neck: supple, no carotid bruits, thyroid not palpable  Lungs: Bilateral equal air entry, clear to ausculation, no wheezing, rales or rhonchi, normal effort  Cardiovascular: normal rate, regular rhythm, no murmur, gallop, rub.   Abdomen: Soft, nontender, nondistended,+ tenderness in the right flank normal bowel sounds, no hepatomegaly or splenomegaly  Neurologic: There are no new focal motor or sensory deficits, normal muscle tone and bulk, no abnormal sensation, normal speech, cranial nerves II through XII grossly intact  Skin: No gross lesions, rashes, bruising or bleeding on exposed skin area  Extremities:  peripheral pulses palpable, no pedal edema or calf pain with palpation  Psych: normal affect       Investigations:      Laboratory Testing:  Recent Results (from the past 24 hour(s))   CBC Auto Differential    Collection Time: 10/26/17  5:15 PM   Result Value Ref Range    WBC 8.1 3.5 - 11.0 k/uL    RBC 4.48 (L) 4.5 - 5.9 m/uL    Hemoglobin 12.2 (L) 13.5 - 17.5 g/dL    Hematocrit 36.4 (L) 41 - 53 %    MCV 81.2 80 - 100 fL    MCH 27.2 26 - 34 pg    MCHC 33.5 31 - 37 g/dL    RDW 13.4 11.5 - 14.5 %    Platelets 292 (L) 411 - 400 k/uL    MPV 9.5 6.0 - 12.0 fL    Differential Type NOT REPORTED     Seg Neutrophils 76 %    Lymphocytes 14 %    Monocytes 8 %    Eosinophils % 2 %    Basophils 0 %    Immature Granulocytes NOT REPORTED 0 %    Segs Absolute 6.20 1.8 - 7.7 k/uL    Absolute Lymph # 1.10 1.0 - 4.8 k/uL    Absolute Mono # 0.60 0.2 - 0.8 k/uL    Absolute Eos # 0.20 0.0 - 0.4 k/uL    Basophils # 0.00 0.0 - 0.2 k/uL    Absolute Immature Granulocyte NOT REPORTED 0.00 - 0.30 k/uL    WBC Morphology NOT REPORTED     RBC Morphology NOT REPORTED     Platelet Estimate NOT REPORTED    Basic Metabolic Panel    Collection Time: 10/26/17  5:15 PM   Result Value Ref Range    Glucose 93 70 - 99 mg/dL    BUN 13 6 - 20 mg/dL    CREATININE 1.81 (H) 0.70 - 1.20 mg/dL    Bun/Cre Ratio 7 (L) 9 - 20    Calcium 8.3 (L) 8.6 - 10.4 mg/dL    Sodium 139 135 - 144 mmol/L    Potassium 4.0 3.7 - 5.3 mmol/L    Chloride 102 98 - 107 mmol/L    CO2 25 20 - 31 mmol/L    Anion Gap 12 9 - 17 mmol/L    GFR Non-African American 42 (L) >60 mL/min    GFR  51 (L) >60 mL/min    GFR Comment          GFR Staging NOT REPORTED    UA W/REFLEX CULTURE    Collection Time: 10/26/17  5:26 PM   Result Value Ref Range    Color, UA YELLOW YEL    Turbidity UA CLEAR CLEAR    Glucose, Ur NEGATIVE NEG    Bilirubin Urine NEGATIVE NEG    Ketones, Urine NEGATIVE NEG    Specific Gravity, UA 1.020 1.005 - 1.030    Urine Hgb NEGATIVE NEG    pH, UA 5.5 5.0 - 8.0    Protein, UA NEGATIVE NEG    Urobilinogen, Urine Normal NORM    Nitrite, Urine NEGATIVE NEG    Leukocyte Esterase, Urine NEGATIVE NEG    Urinalysis Comments NOT REPORTED    Basic metabolic panel    Collection Time: 10/27/17  7:42 AM   Result Value Ref Range    Glucose 91 70 - 99 mg/dL    BUN 10 6 - 20 mg/dL    CREATININE 1.11 0.70 - 1.20 mg/dL    Bun/Cre Ratio 9 9 - 20    Calcium 8.4 (L) 8.6 - 10.4 mg/dL    Sodium 143 135 - 144 mmol/L    Potassium 4.2 3.7 - 5.3 mmol/L    Chloride 107 98 - 107 mmol/L    CO2 26 20 - 31 mmol/L    Anion Gap 10 9 - 17 mmol/L    GFR Non-African American >60 >60 mL/min    GFR African American >60 >60 mL/min    GFR Comment          GFR Staging NOT REPORTED    CBC    Collection Time: 10/27/17  7:42 AM   Result Value Ref Range    WBC 6.4 3.5 - 11.0 k/uL    RBC 4.44 (L) 4.5 - 5.9 m/uL    Hemoglobin 12.0 (L) 13.5 - 17.5 g/dL    Hematocrit 36.3 (L) 41 - 53 %    MCV 81.7 80 - 100 fL    MCH 27.1 26 - 34 pg    MCHC 33.2 31 - 37 g/dL    RDW 13.1 11.5 - 14.5 %    Platelets 125 638 - 001 k/uL    MPV 9.8 6.0 - 12.0 fL       Imaging/Diagnostics:  Ct abdomen  1. Obstructing 4 mm calculus at the right UVJ with moderate to severe   right-sided hydronephrosis and associated hydroureter. 2. Punctate nonobstructing calculus measuring 2 mm at the upper pole of the   left kidney. 3. Mild scattered colonic diverticulosis.  Mild diverticulosis of the   rectosigmoid and sigmoid colon. 4. Normal appendix. 5. Small hiatal hernia. 6. Poorly defined region of low attenuation in the inferior right hepatic   lobe adjacent to the gallbladder fossa.  Initial further evaluation with   nonemergent right upper quadrant abdominal/liver ultrasound is recommended. MR liver mass protocol may eventually be necessary for optimal evaluation. Assessment :      Primary Problem  Obstructive uropathy    Active Hospital Problems    Diagnosis Date Noted    Obstructive uropathy [N13.9] 14/51/3376    Renal colic on right side [J15] 10/27/2017    ELLIS (acute kidney injury) ([facility] [address] [N17.9] 10/27/2017    Kidney stone [N20.0]     GERD (gastroesophageal reflux disease) [K77.0]    Renal colic  Stone  ELLIS  Plan:     Patient status Admit as inpatient in the  Progressive Unit/Step down    1. Pain control  2. Strain urine  3. Urine culture  4. Continue Cipro  5. He will go to OR today for cystoscopy and possible stenting    Consultations:   IP CONSULT TO UROLOGY  IP CONSULT TO INTERNAL MEDICINE  IP CONSULT TO UROLOGY     Patient is admitted as inpatient status because of co-morbidities listed above, severity of signs and symptoms as outlined, requirement for current medical therapies and most importantly because of direct risk to patient if care not provided in a hospital setting.     Beena Omer MD  10/27/2017  1:57 PM    Copy sent to Dr. Brenda Carlos

## 2017-10-27 NOTE — PLAN OF CARE
[facility]    Second Visit Note  For more detailed information please refer to the progress note of the day      10/27/2017    5:27 PM    Name:   Oly Ferrera  MRN:     [id number]     Acct:      [de-identified]   Room:   2025/2025-01   Day:  1  Admit Date:  10/26/2017  4:52 PM    PCP:   Maura Moyer MD  Code Status:  Full Code        Pt vitals were reviewed   New labs were reviewed   Patient was seen    Updated plan :     1. Patient had cystoscopy and stent placement today  2. His abdominal pain and flank pain has been relieved  3.  Discussed with Dr. Sonal Lozano, he can go home today and continue medications as prescribed by Dr. Nuvia Sun MD  10/27/2017  5:27 PM

## 2017-10-27 NOTE — PROGRESS NOTES
DR Marcos Valera and Dr Mora Round to the bedside; discharge instructions discussed with the patient per the physicians.

## 2017-10-27 NOTE — ANESTHESIA PRE PROCEDURE
Department of Anesthesiology  Preprocedure Note       Name:  Connie Ferrera   Age:  38 y.o.  :  1975                                          MRN:  [id number]         Date:  10/27/2017      Surgeon: Danii Michele):  Анна Valentine MD    Procedure: Procedure(s):  CYSTOSCOPY WITH HOLMIUM LASER    Medications prior to admission:   Prior to Admission medications    Medication Sig Start Date End Date Taking?  Authorizing Provider   omeprazole (PRILOSEC) 20 MG capsule 2 times daily  1/20/15  Yes Historical Provider, MD   ibuprofen (ADVIL;MOTRIN) 800 MG tablet  1/20/15  Yes Historical Provider, MD       Current medications:    Current Facility-Administered Medications   Medication Dose Route Frequency Provider Last Rate Last Dose    0.9 % sodium chloride infusion   Intravenous Continuous Cherylann Wallace,  mL/hr at 10/27/17 0241      sodium chloride flush 0.9 % injection 10 mL  10 mL Intravenous 2 times per day Cherylann Wallace, CNP   10 mL at 10/27/17 1025    sodium chloride flush 0.9 % injection 10 mL  10 mL Intravenous PRN Cherylann Wallace, CNP        bisacodyl (DULCOLAX) suppository 10 mg  10 mg Rectal Daily PRN Cherylann Wallace, CNP        ondansetron [facility]) injection 4 mg  4 mg Intravenous Q6H PRN Cherylann Wallace, CNP   4 mg at 10/27/17 0929    enoxaparin (LOVENOX) injection 40 mg  40 mg Subcutaneous Daily Cherylann Wallace, CNP        famotidine (PEPCID) injection 20 mg  20 mg Intravenous BID Cherylann Wallace, CNP   20 mg at 10/27/17 1025    morphine (PF) injection 1 mg  1 mg Intravenous Q3H PRN Keenan Cortez MD        ciprofloxacin (CIPRO) IVPB 400 mg  400 mg Intravenous Q12H Cherylann Wallace, CNP   Stopped at 10/27/17 1130       Allergies:  No Known Allergies    Problem List:    Patient Active Problem List   Diagnosis Code    GERD (gastroesophageal reflux disease) K21.9       Past Medical History:        Diagnosis Date    GERD (gastroesophageal reflux disease)     Date    PROTIME 9.6 08/15/2015    INR 0.9 08/15/2015    APTT 25.7 08/15/2015       HCG (If Applicable): No results found for: PREGTESTUR, PREGSERUM, HCG, HCGQUANT     ABGs: No results found for: PHART, PO2ART, VSS8NZQ, ROM7LWS, BEART, X3LGLEKM     Type & Screen (If Applicable):  No results found for: LABABO, LABRH    Anesthesia Evaluation   no history of anesthetic complications:   Airway: Mallampati: II  TM distance: >3 FB   Neck ROM: full  Mouth opening: > = 3 FB Dental: normal exam         Pulmonary: breath sounds clear to auscultation      (-) COPD, asthma, recent URI and sleep apnea                           Cardiovascular:  Exercise tolerance: good (>4 METS),       (-) pacemaker, hypertension, past MI, CABG/stent, dysrhythmias,  angina and  KRISHNAN      Rhythm: regular  Rate: normal           Beta Blocker:  Not on Beta Blocker         Neuro/Psych:      (-) seizures and CVA           GI/Hepatic/Renal:   (+) GERD: well controlled,      (-) liver disease       Endo/Other:        (-) hypothyroidism, hyperthyroidism, no Type II DM, no Type I DM               Abdominal:         (-) obese     Vascular:     - PVD, DVT and PE. Anesthesia Plan      MAC and general     ASA 2       Induction: intravenous. MIPS: Postoperative opioids intended. Anesthetic plan and risks discussed with patient. Plan discussed with CRNA.     Attending anesthesiologist reviewed and agrees with Pre Eval content              Hernandez Sahu MD   10/27/2017

## 2017-10-27 NOTE — PLAN OF CARE
Problem: Tobacco Use:  Goal: Inpatient tobacco use cessation counseling participation  Inpatient tobacco use cessation counseling participation  Outcome: Completed Date Met: 10/27/17  Patient does not smoke and never has. The patch was ordered for him, which he refused.

## 2025-04-29 ENCOUNTER — APPOINTMENT (OUTPATIENT)
Dept: GENERAL RADIOLOGY | Age: 50
End: 2025-04-29
Payer: MEDICAID

## 2025-04-29 VITALS
TEMPERATURE: 97.9 F | HEART RATE: 68 BPM | OXYGEN SATURATION: 100 % | SYSTOLIC BLOOD PRESSURE: 130 MMHG | RESPIRATION RATE: 18 BRPM | HEIGHT: 71 IN | BODY MASS INDEX: 28 KG/M2 | DIASTOLIC BLOOD PRESSURE: 82 MMHG | WEIGHT: 200 LBS

## 2025-04-29 PROCEDURE — 73030 X-RAY EXAM OF SHOULDER: CPT

## 2025-04-29 PROCEDURE — 99283 EMERGENCY DEPT VISIT LOW MDM: CPT

## 2025-04-29 ASSESSMENT — PAIN - FUNCTIONAL ASSESSMENT: PAIN_FUNCTIONAL_ASSESSMENT: 0-10

## 2025-04-29 ASSESSMENT — PAIN SCALES - GENERAL: PAINLEVEL_OUTOF10: 10

## 2025-04-29 ASSESSMENT — LIFESTYLE VARIABLES
HOW MANY STANDARD DRINKS CONTAINING ALCOHOL DO YOU HAVE ON A TYPICAL DAY: PATIENT DOES NOT DRINK
HOW OFTEN DO YOU HAVE A DRINK CONTAINING ALCOHOL: NEVER

## 2025-04-30 ENCOUNTER — APPOINTMENT (OUTPATIENT)
Dept: GENERAL RADIOLOGY | Age: 50
End: 2025-04-30
Payer: MEDICAID

## 2025-04-30 ENCOUNTER — HOSPITAL ENCOUNTER (EMERGENCY)
Age: 50
Discharge: HOME | End: 2025-04-30
Attending: EMERGENCY MEDICINE
Payer: MEDICAID

## 2025-04-30 DIAGNOSIS — S50.311A ABRASION OF RIGHT ELBOW, INITIAL ENCOUNTER: ICD-10-CM

## 2025-04-30 DIAGNOSIS — S40.011A CONTUSION OF MULTIPLE SITES OF RIGHT SHOULDER AND UPPER ARM, INITIAL ENCOUNTER: ICD-10-CM

## 2025-04-30 DIAGNOSIS — S50.01XA CONTUSION OF RIGHT ELBOW, INITIAL ENCOUNTER: Primary | ICD-10-CM

## 2025-04-30 DIAGNOSIS — S40.021A CONTUSION OF MULTIPLE SITES OF RIGHT SHOULDER AND UPPER ARM, INITIAL ENCOUNTER: ICD-10-CM

## 2025-04-30 PROCEDURE — 6370000000 HC RX 637 (ALT 250 FOR IP): Performed by: EMERGENCY MEDICINE

## 2025-04-30 PROCEDURE — 73080 X-RAY EXAM OF ELBOW: CPT

## 2025-04-30 RX ORDER — HYDROCODONE BITARTRATE AND ACETAMINOPHEN 5; 325 MG/1; MG/1
1 TABLET ORAL ONCE
Status: COMPLETED | OUTPATIENT
Start: 2025-04-30 | End: 2025-04-30

## 2025-04-30 RX ADMIN — HYDROCODONE BITARTRATE AND ACETAMINOPHEN 1 TABLET: 5; 325 TABLET ORAL at 01:53

## 2025-04-30 NOTE — ED PROVIDER NOTES
myself.  XR SHOULDER RIGHT (MIN 2 VIEWS)   Final Result   No acute osseous or soft tissue abnormality.         XR ELBOW RIGHT (MIN 3 VIEWS)   Final Result   No acute osseous or soft tissue abnormality.           Vitals Reviewed:    Vitals:    04/29/25 2344   BP: 130/82   Pulse: 68   Resp: 18   Temp: 97.9 °F (36.6 °C)   SpO2: 100%   Weight: 90.7 kg (200 lb)   Height: 1.803 m (5' 11\")     MEDICATIONS GIVEN TO PATIENT THIS ENCOUNTER:  Orders Placed This Encounter   Medications    HYDROcodone-acetaminophen (NORCO) 5-325 MG per tablet 1 tablet    DISCONTD: tetanus-diphth-acell pertussis (BOOSTRIX) injection 0.5 mL     DISCHARGE PRESCRIPTIONS:  Discharge Medication List as of 4/30/2025  2:53 AM        PHYSICIAN CONSULTS ORDERED THIS ENCOUNTER:  None     FINAL IMPRESSION      1. Contusion of right elbow, initial encounter    2. Contusion of multiple sites of right shoulder and upper arm, initial encounter    3. Abrasion of right elbow, initial encounter          DISPOSITION/PLAN   DISPOSITION Decision To Discharge 04/30/2025 02:53:17 AM   DISPOSITION CONDITION Stable           PATIENT REFERRED TO:  Agustina Yousif MD  [address]  [facility] [id number] [phone]    In 3 days      [facility] Emergency Department  [address] [address]  [phone]    As needed      DISCHARGE MEDICATIONS:  Discharge Medication List as of 4/30/2025  2:53 AM            Clinton Landrum MD  Attending Emergency Physician                      Clinton Landrum MD  04/30/25 0546

## 2025-04-30 NOTE — ED NOTES
Mode of arrival (squad #, walk in, police, etc) : WALK-IN        Chief complaint(s): Fall, right side injury        Arrival Note (brief scenario, treatment PTA, etc).: Pt arrived to the ED after a fall. Pt states that his foot got caught when trying to get out of the car and he landed on his right side. Patients biggest complaint is to the right elbow/shoulder that he landed on. Pt is unsure of LOC but denies blood thinners.         C= \"Have you ever felt that you should Cut down on your drinking?\"  No  A= \"Have people Annoyed you by criticizing your drinking?\"  No  G= \"Have you ever felt bad or Guilty about your drinking?\"  No  E= \"Have you ever had a drink as an Eye-opener first thing in the morning to steady your nerves or to help a hangover?\"  No      Deferred []      Reason for deferring: N/A    *If yes to two or more: probable alcohol abuse.*

## (undated) DEVICE — BALLOON DILATATION CATHETER KIT: Brand: UROMAX ULTRA KIT

## (undated) DEVICE — MASTISOL ADHESIVE LIQ 2/3ML

## (undated) DEVICE — Z DUP USE 2522782 SOLUTION IRRIG 1000ML STRL H2O PLAS CONTAINER UROMATIC

## (undated) DEVICE — GLOVE SURG SZ 7 L12IN FNGR THK87MIL WHT LTX FREE

## (undated) DEVICE — CATHETER URET 8FR L65CM PLAS OPN CONE TIP RADPQ DISP

## (undated) DEVICE — SINGLE ACTION PUMPING SYSTEM

## (undated) DEVICE — RADIFOCUS GLIDEWIRE: Brand: GLIDEWIRE

## (undated) DEVICE — Device

## (undated) DEVICE — DUP USE 240185 SOLUTION IV IRRIG WATER 1000ML IRRIG BAG 2B7114

## (undated) DEVICE — Z CONVERTED USE 2271043 CONTAINER SPEC COLL 4OZ SCR ON LID PEEL PCH

## (undated) DEVICE — CHECK-FLO HEMOSTASIS ASSEMBLY: Brand: CHECK-FLO

## (undated) DEVICE — 60 ML SYRINGE LUER-LOCK TIP: Brand: MONOJECT